# Patient Record
Sex: MALE | URBAN - METROPOLITAN AREA
[De-identification: names, ages, dates, MRNs, and addresses within clinical notes are randomized per-mention and may not be internally consistent; named-entity substitution may affect disease eponyms.]

---

## 2022-05-20 ENCOUNTER — OFFICE VISIT (OUTPATIENT)
Dept: URBAN - METROPOLITAN AREA CLINIC 82 | Facility: CLINIC | Age: 22
End: 2022-05-20

## 2022-05-20 ENCOUNTER — WEB ENCOUNTER (OUTPATIENT)
Dept: URBAN - METROPOLITAN AREA CLINIC 82 | Facility: CLINIC | Age: 22
End: 2022-05-20

## 2022-05-23 ENCOUNTER — OFFICE VISIT (OUTPATIENT)
Dept: URBAN - METROPOLITAN AREA CLINIC 82 | Facility: CLINIC | Age: 22
End: 2022-05-23
Payer: COMMERCIAL

## 2022-05-23 ENCOUNTER — LAB OUTSIDE AN ENCOUNTER (OUTPATIENT)
Dept: URBAN - METROPOLITAN AREA CLINIC 82 | Facility: CLINIC | Age: 22
End: 2022-05-23

## 2022-05-23 DIAGNOSIS — R10.13 EPIGASTRIC ABDOMINAL PAIN: ICD-10-CM

## 2022-05-23 DIAGNOSIS — R63.4 WEIGHT LOSS: ICD-10-CM

## 2022-05-23 DIAGNOSIS — R19.8 ALTERNATING CONSTIPATION AND DIARRHEA: ICD-10-CM

## 2022-05-23 DIAGNOSIS — R11.14 BILIOUS VOMITING WITH NAUSEA: ICD-10-CM

## 2022-05-23 DIAGNOSIS — F12.90 MARIJUANA USE: ICD-10-CM

## 2022-05-23 PROCEDURE — 99204 OFFICE O/P NEW MOD 45 MIN: CPT | Performed by: INTERNAL MEDICINE

## 2022-05-23 NOTE — HPI-TODAY'S VISIT:
FEW MONTHS GI ISSUES , LOT OF PAIN, AND NASUEA, EPIGASTRIC PAIN,.  INFREQUENT BM , CONSTIPATION AND ALTERNATE WITH DIARRHE  LOT OF VOMITING,   EAT NOT GOOD, LOST WT 20LBS IN ONE MONTH  VOMITING DEPENDS, ON NO SOLIDS, ONLY LIQUIDS COLOR VARIES  NO BLOOD IN STOOL, IN BEGINING SOME BLOOD IN VOMITING  NO SMOKING OR DRINK, INGESTED EDIBLE MARIJUANA. , MEDICAL WITHDRAWL FROM SCHOOL  HAD ANXIETY, DEPRESSION, NOT MEDICATION, CURRENTLY HAS APPOINTMENT.   STARTED COUNSELLING.   NO FHCC/IBD.

## 2022-06-03 ENCOUNTER — LAB OUTSIDE AN ENCOUNTER (OUTPATIENT)
Dept: URBAN - METROPOLITAN AREA CLINIC 82 | Facility: CLINIC | Age: 22
End: 2022-06-03

## 2022-06-07 ENCOUNTER — OFFICE VISIT (OUTPATIENT)
Dept: URBAN - METROPOLITAN AREA SURGERY CENTER 13 | Facility: SURGERY CENTER | Age: 22
End: 2022-06-07
Payer: COMMERCIAL

## 2022-06-07 ENCOUNTER — CLAIMS CREATED FROM THE CLAIM WINDOW (OUTPATIENT)
Dept: URBAN - METROPOLITAN AREA CLINIC 4 | Facility: CLINIC | Age: 22
End: 2022-06-07
Payer: COMMERCIAL

## 2022-06-07 DIAGNOSIS — R63.4 ABNORMAL INTENTIONAL WEIGHT LOSS: ICD-10-CM

## 2022-06-07 DIAGNOSIS — K20.0 EOSINOPHILIC ESOPHAGITIS: ICD-10-CM

## 2022-06-07 DIAGNOSIS — K31.89 ACQUIRED DEFORMITY OF DUODENUM: ICD-10-CM

## 2022-06-07 DIAGNOSIS — R11.10 ACUTE VOMITING: ICD-10-CM

## 2022-06-07 DIAGNOSIS — K31.89 OTHER DISEASES OF STOMACH AND DUODENUM: ICD-10-CM

## 2022-06-07 DIAGNOSIS — K20.0 ALLERGIC EOSINOPHILIC ESOPHAGITIS: ICD-10-CM

## 2022-06-07 LAB — GASTROINTESTINAL PATHOGEN: (no result)

## 2022-06-07 PROCEDURE — 88312 SPECIAL STAINS GROUP 1: CPT | Performed by: PATHOLOGY

## 2022-06-07 PROCEDURE — 88305 TISSUE EXAM BY PATHOLOGIST: CPT | Performed by: PATHOLOGY

## 2022-06-07 PROCEDURE — G8907 PT DOC NO EVENTS ON DISCHARG: HCPCS | Performed by: INTERNAL MEDICINE

## 2022-06-07 PROCEDURE — 43239 EGD BIOPSY SINGLE/MULTIPLE: CPT | Performed by: INTERNAL MEDICINE

## 2022-06-16 ENCOUNTER — TELEPHONE ENCOUNTER (OUTPATIENT)
Dept: URBAN - METROPOLITAN AREA CLINIC 92 | Facility: CLINIC | Age: 22
End: 2022-06-16

## 2022-06-16 RX ORDER — FLUTICASONE PROPIONATE 220 UG/1
2 PUFF AEROSOL, METERED RESPIRATORY (INHALATION) TWICE A DAY
Qty: 30 | Refills: 2 | OUTPATIENT
Start: 2022-06-16 | End: 2022-09-14

## 2022-06-16 RX ORDER — PANTOPRAZOLE SODIUM 40 MG/1
1 TABLET TABLET, DELAYED RELEASE ORAL TWICE A DAY
Qty: 60 TABLET | Refills: 2 | OUTPATIENT
Start: 2022-06-16

## 2022-07-12 ENCOUNTER — WEB ENCOUNTER (OUTPATIENT)
Dept: URBAN - METROPOLITAN AREA CLINIC 82 | Facility: CLINIC | Age: 22
End: 2022-07-12

## 2022-08-22 ENCOUNTER — LAB OUTSIDE AN ENCOUNTER (OUTPATIENT)
Dept: URBAN - METROPOLITAN AREA CLINIC 92 | Facility: CLINIC | Age: 22
End: 2022-08-22

## 2022-08-22 ENCOUNTER — TELEPHONE ENCOUNTER (OUTPATIENT)
Dept: URBAN - METROPOLITAN AREA CLINIC 92 | Facility: CLINIC | Age: 22
End: 2022-08-22

## 2022-08-22 PROBLEM — 21522001 ABDOMINAL PAIN: Status: ACTIVE | Noted: 2022-08-22

## 2022-08-22 PROBLEM — 62315008 DIARRHEA: Status: ACTIVE | Noted: 2022-08-22

## 2022-08-22 RX ORDER — FLUTICASONE PROPIONATE 220 UG/1
2 PUFF AEROSOL, METERED RESPIRATORY (INHALATION) TWICE A DAY
Qty: 30 | Refills: 2 | Status: ACTIVE | COMMUNITY
Start: 2022-06-16 | End: 2022-09-14

## 2022-08-22 RX ORDER — PANTOPRAZOLE SODIUM 40 MG/1
1 TABLET TABLET, DELAYED RELEASE ORAL TWICE A DAY
Qty: 60 TABLET | Refills: 2 | Status: ACTIVE | COMMUNITY
Start: 2022-06-16

## 2022-09-12 ENCOUNTER — OFFICE VISIT (OUTPATIENT)
Dept: URBAN - METROPOLITAN AREA CLINIC 82 | Facility: CLINIC | Age: 22
End: 2022-09-12

## 2022-09-12 RX ORDER — PANTOPRAZOLE SODIUM 40 MG/1
1 TABLET TABLET, DELAYED RELEASE ORAL TWICE A DAY
Qty: 60 TABLET | Refills: 2 | COMMUNITY
Start: 2022-06-16

## 2022-09-12 RX ORDER — FLUTICASONE PROPIONATE 220 UG/1
2 PUFF AEROSOL, METERED RESPIRATORY (INHALATION) TWICE A DAY
Qty: 30 | Refills: 2 | COMMUNITY
Start: 2022-06-16 | End: 2022-09-14

## 2022-10-27 ENCOUNTER — LAB OUTSIDE AN ENCOUNTER (OUTPATIENT)
Dept: URBAN - METROPOLITAN AREA CLINIC 82 | Facility: CLINIC | Age: 22
End: 2022-10-27

## 2022-11-03 LAB — GASTROINTESTINAL PATHOGEN: (no result)

## 2022-11-04 ENCOUNTER — TELEPHONE ENCOUNTER (OUTPATIENT)
Dept: URBAN - METROPOLITAN AREA CLINIC 115 | Facility: CLINIC | Age: 22
End: 2022-11-04

## 2022-11-04 ENCOUNTER — OFFICE VISIT (OUTPATIENT)
Dept: URBAN - METROPOLITAN AREA CLINIC 82 | Facility: CLINIC | Age: 22
End: 2022-11-04
Payer: COMMERCIAL

## 2022-11-04 VITALS
HEART RATE: 75 BPM | DIASTOLIC BLOOD PRESSURE: 50 MMHG | WEIGHT: 151.4 LBS | TEMPERATURE: 97.3 F | SYSTOLIC BLOOD PRESSURE: 84 MMHG | BODY MASS INDEX: 22.94 KG/M2 | HEIGHT: 68 IN

## 2022-11-04 DIAGNOSIS — F12.90 MARIJUANA USE: ICD-10-CM

## 2022-11-04 DIAGNOSIS — R63.4 WEIGHT LOSS: ICD-10-CM

## 2022-11-04 DIAGNOSIS — R11.14 BILIOUS VOMITING WITH NAUSEA: ICD-10-CM

## 2022-11-04 DIAGNOSIS — R10.13 EPIGASTRIC ABDOMINAL PAIN: ICD-10-CM

## 2022-11-04 DIAGNOSIS — R19.8 ALTERNATING CONSTIPATION AND DIARRHEA: ICD-10-CM

## 2022-11-04 DIAGNOSIS — K20.0 EOSINOPHILIC ESOPHAGITIS: ICD-10-CM

## 2022-11-04 PROBLEM — 733460004: Status: ACTIVE | Noted: 2022-05-23

## 2022-11-04 PROBLEM — 71419002 BILIOUS VOMITING: Status: ACTIVE | Noted: 2022-05-23

## 2022-11-04 PROCEDURE — 99213 OFFICE O/P EST LOW 20 MIN: CPT | Performed by: INTERNAL MEDICINE

## 2022-11-04 RX ORDER — FLUTICASONE PROPIONATE 220 UG/1
2 PUFF AEROSOL, METERED RESPIRATORY (INHALATION) TWICE A DAY
Qty: 3 | Refills: 2 | OUTPATIENT
Start: 2022-11-04 | End: 2023-02-01

## 2022-11-04 RX ORDER — PANTOPRAZOLE SODIUM 40 MG/1
1 TABLET TABLET, DELAYED RELEASE ORAL ONCE A DAY
Qty: 30 | Refills: 3 | OUTPATIENT
Start: 2022-11-04

## 2022-11-04 RX ORDER — PANTOPRAZOLE SODIUM 40 MG/1
1 TABLET TABLET, DELAYED RELEASE ORAL TWICE A DAY
Qty: 60 TABLET | Refills: 2 | Status: DISCONTINUED | COMMUNITY
Start: 2022-06-16

## 2023-01-13 ENCOUNTER — OFFICE VISIT (OUTPATIENT)
Dept: URBAN - METROPOLITAN AREA CLINIC 82 | Facility: CLINIC | Age: 23
End: 2023-01-13

## 2023-01-13 RX ORDER — PANTOPRAZOLE SODIUM 40 MG/1
1 TABLET TABLET, DELAYED RELEASE ORAL ONCE A DAY
Qty: 30 | Refills: 3 | COMMUNITY
Start: 2022-11-04

## 2023-01-13 RX ORDER — FLUTICASONE PROPIONATE 220 UG/1
2 PUFF AEROSOL, METERED RESPIRATORY (INHALATION) TWICE A DAY
Qty: 3 | Refills: 2 | COMMUNITY
Start: 2022-11-04 | End: 2023-02-01

## 2023-01-23 ENCOUNTER — TELEPHONE ENCOUNTER (OUTPATIENT)
Dept: URBAN - METROPOLITAN AREA CLINIC 82 | Facility: CLINIC | Age: 23
End: 2023-01-23

## 2023-04-17 ENCOUNTER — OFFICE VISIT (OUTPATIENT)
Dept: URBAN - METROPOLITAN AREA CLINIC 115 | Facility: CLINIC | Age: 23
End: 2023-04-17
Payer: COMMERCIAL

## 2023-04-17 ENCOUNTER — DASHBOARD ENCOUNTERS (OUTPATIENT)
Age: 23
End: 2023-04-17

## 2023-04-17 ENCOUNTER — LAB OUTSIDE AN ENCOUNTER (OUTPATIENT)
Dept: URBAN - METROPOLITAN AREA CLINIC 115 | Facility: CLINIC | Age: 23
End: 2023-04-17

## 2023-04-17 VITALS
SYSTOLIC BLOOD PRESSURE: 124 MMHG | DIASTOLIC BLOOD PRESSURE: 75 MMHG | HEART RATE: 92 BPM | BODY MASS INDEX: 22.64 KG/M2 | WEIGHT: 149.4 LBS | TEMPERATURE: 98.8 F | HEIGHT: 68 IN

## 2023-04-17 DIAGNOSIS — F12.90 MARIJUANA USE: ICD-10-CM

## 2023-04-17 DIAGNOSIS — R63.4 WEIGHT LOSS: ICD-10-CM

## 2023-04-17 DIAGNOSIS — K20.0 EOSINOPHILIC ESOPHAGITIS: ICD-10-CM

## 2023-04-17 DIAGNOSIS — R11.14 BILIOUS VOMITING WITH NAUSEA: ICD-10-CM

## 2023-04-17 DIAGNOSIS — R10.13 EPIGASTRIC ABDOMINAL PAIN: ICD-10-CM

## 2023-04-17 DIAGNOSIS — R19.8 ALTERNATING CONSTIPATION AND DIARRHEA: ICD-10-CM

## 2023-04-17 PROBLEM — 249517009: Status: ACTIVE | Noted: 2022-05-23

## 2023-04-17 PROBLEM — 816160009 WEIGHT LOSS: Status: ACTIVE | Noted: 2022-05-23

## 2023-04-17 PROBLEM — 79922009 EPIGASTRIC PAIN: Status: ACTIVE | Noted: 2022-05-23

## 2023-04-17 PROBLEM — 235599003: Status: ACTIVE | Noted: 2022-11-04

## 2023-04-17 PROCEDURE — 99214 OFFICE O/P EST MOD 30 MIN: CPT | Performed by: INTERNAL MEDICINE

## 2023-04-17 RX ORDER — FLUOXETINE 20 MG/1
1 CAPSULE CAPSULE ORAL ONCE A DAY
Status: ACTIVE | COMMUNITY

## 2023-04-17 RX ORDER — ALBUTEROL SULFATE 90 UG/1
1 PUFF AS NEEDED AEROSOL, METERED RESPIRATORY (INHALATION)
Status: ACTIVE | COMMUNITY

## 2023-04-17 RX ORDER — PANTOPRAZOLE SODIUM 40 MG/1
1 TABLET TABLET, DELAYED RELEASE ORAL ONCE A DAY
Qty: 30 | Refills: 3 | OUTPATIENT

## 2023-04-17 RX ORDER — PANTOPRAZOLE SODIUM 40 MG/1
1 TABLET TABLET, DELAYED RELEASE ORAL ONCE A DAY
Qty: 30 | Refills: 3 | Status: ACTIVE | COMMUNITY
Start: 2022-11-04

## 2023-04-17 RX ORDER — MONTELUKAST 10 MG/1
1 TABLET TABLET, FILM COATED ORAL ONCE A DAY
Status: ACTIVE | COMMUNITY

## 2023-04-17 RX ORDER — FLUTICASONE PROPIONATE 220 UG/1
2 PUFFS AEROSOL, METERED RESPIRATORY (INHALATION) TWICE A DAY
Status: ACTIVE | COMMUNITY

## 2023-04-17 RX ORDER — DICYCLOMINE HYDROCHLORIDE 20 MG/1
1 TABLET TABLET ORAL THREE TIMES A DAY
Status: ACTIVE | COMMUNITY

## 2023-04-17 NOTE — HPI-TODAY'S VISIT:
DOING GOOD  NO VOMITING, OR DIARRHE, FOR 2 MONTHS  THIS AM THROW UP  STILL SWALLOW THE FLOVENT 2 PUFF TO SWALLOW, PANTO PRAZOLE DAILY  DICYCLOMINE AS  NEEDED, ADV TO STOP DAILY.  NO WT LOSS, GAINED  NO MARIJUANA, FOR 6 MONTHS  NO DYSPHGIA OR ABDOMINAL MARISELA

## 2023-05-04 ENCOUNTER — OFFICE VISIT (OUTPATIENT)
Dept: URBAN - METROPOLITAN AREA SURGERY CENTER 13 | Facility: SURGERY CENTER | Age: 23
End: 2023-05-04

## 2024-07-01 ENCOUNTER — OFFICE VISIT (OUTPATIENT)
Dept: URBAN - METROPOLITAN AREA CLINIC 115 | Facility: CLINIC | Age: 24
End: 2024-07-01
Payer: COMMERCIAL

## 2024-07-01 ENCOUNTER — LAB OUTSIDE AN ENCOUNTER (OUTPATIENT)
Dept: URBAN - METROPOLITAN AREA CLINIC 115 | Facility: CLINIC | Age: 24
End: 2024-07-01

## 2024-07-01 VITALS
TEMPERATURE: 98 F | SYSTOLIC BLOOD PRESSURE: 98 MMHG | HEIGHT: 68 IN | WEIGHT: 140 LBS | BODY MASS INDEX: 21.22 KG/M2 | DIASTOLIC BLOOD PRESSURE: 64 MMHG | HEART RATE: 94 BPM

## 2024-07-01 DIAGNOSIS — R14.0 ABDOMINAL BLOATING: ICD-10-CM

## 2024-07-01 DIAGNOSIS — R11.14 BILIOUS VOMITING WITH NAUSEA: ICD-10-CM

## 2024-07-01 DIAGNOSIS — R19.8 ALTERNATING CONSTIPATION AND DIARRHEA: ICD-10-CM

## 2024-07-01 DIAGNOSIS — R63.4 WEIGHT LOSS: ICD-10-CM

## 2024-07-01 DIAGNOSIS — F12.90 MARIJUANA USE: ICD-10-CM

## 2024-07-01 DIAGNOSIS — K20.0 EOSINOPHILIC ESOPHAGITIS: ICD-10-CM

## 2024-07-01 PROBLEM — 116289008: Status: ACTIVE | Noted: 2024-07-01

## 2024-07-01 PROCEDURE — 99214 OFFICE O/P EST MOD 30 MIN: CPT | Performed by: INTERNAL MEDICINE

## 2024-07-01 RX ORDER — ALBUTEROL SULFATE 90 UG/1
1 PUFF AS NEEDED AEROSOL, METERED RESPIRATORY (INHALATION)
Status: ACTIVE | COMMUNITY

## 2024-07-01 RX ORDER — DICYCLOMINE HYDROCHLORIDE 20 MG/1
1 TABLET TABLET ORAL THREE TIMES A DAY
Status: ACTIVE | COMMUNITY

## 2024-07-01 RX ORDER — FLUOXETINE 20 MG/1
1 CAPSULE CAPSULE ORAL ONCE A DAY
Status: ACTIVE | COMMUNITY

## 2024-07-01 RX ORDER — PANTOPRAZOLE SODIUM 40 MG/1
1 TABLET TABLET, DELAYED RELEASE ORAL ONCE A DAY
Qty: 30 | Refills: 3 | OUTPATIENT

## 2024-07-01 RX ORDER — DOCUSATE SODIUM 100 MG/1
1 CAPSULE AS NEEDED CAPSULE, LIQUID FILLED ORAL ONCE A DAY
Status: ACTIVE | COMMUNITY

## 2024-07-01 RX ORDER — MONTELUKAST 10 MG/1
1 TABLET TABLET, FILM COATED ORAL ONCE A DAY
Status: ACTIVE | COMMUNITY

## 2024-07-01 RX ORDER — PANTOPRAZOLE SODIUM 40 MG/1
1 TABLET TABLET, DELAYED RELEASE ORAL ONCE A DAY
Qty: 30 | Refills: 3 | Status: ACTIVE | COMMUNITY

## 2024-07-01 RX ORDER — FLUTICASONE PROPIONATE 220 UG/1
2 PUFFS AEROSOL, METERED RESPIRATORY (INHALATION) TWICE A DAY
Status: ACTIVE | COMMUNITY

## 2024-07-01 NOTE — HPI-TODAY'S VISIT:
LOT BETTER.   SWALLOWING IS LOT BETTER, NO N/V  STILL SOME CONSTIPATION. LITTLE BLOATING WITH CARBOHYDRATE  NO VOMITING,  NO MORE MARIJUANA  USING FLOVENT INHALER, TO SWALLOWING, AND ALBUTEROL INHALER  BY DR CHANDRA,   TAKES PANTOPRAZOLE.  SEEN ALLERGIST IN PAST,  DID SKIN TESTING, MOST NOTABLE WAS SHELL FISH AND SEASONAL   ABLE TO TOLERAT MILK.   BLOATING WHEN EATS BREAD.

## 2024-07-21 ENCOUNTER — LAB OUTSIDE AN ENCOUNTER (OUTPATIENT)
Dept: URBAN - METROPOLITAN AREA CLINIC 82 | Facility: CLINIC | Age: 24
End: 2024-07-21

## 2024-07-22 LAB
IMMUNOGLOBULIN A, QN, SERUM: 173
T-TRANSGLUTAMINASE (TTG) IGA: <1

## 2024-08-08 ENCOUNTER — OFFICE VISIT (OUTPATIENT)
Dept: URBAN - METROPOLITAN AREA SURGERY CENTER 13 | Facility: SURGERY CENTER | Age: 24
End: 2024-08-08

## 2025-04-25 ENCOUNTER — TELEPHONE ENCOUNTER (OUTPATIENT)
Dept: URBAN - METROPOLITAN AREA CLINIC 115 | Facility: CLINIC | Age: 25
End: 2025-04-25